# Patient Record
(demographics unavailable — no encounter records)

---

## 2024-12-20 NOTE — ASSESSMENT
[FreeTextEntry1] : 67-year-old man with BPH with LUTS. Not bothersome.w ill observe.   For LEFT renal stone, discussed treatment with ureteroscopy but pt declines for now. Sandra send for RBUS   For gross hematuria, discussed last work up was 3 years ago. Will schedule cystoscopy.

## 2024-12-20 NOTE — HISTORY OF PRESENT ILLNESS
[FreeTextEntry1] : 65 year old man seen 07/05/2022 with complaint possible hematuria/ blood through the urethra. Patient reports he noted some blood on his underwear near his genitalia and suspects it may have been from his penis, he reports he examined himself and did not note any abrasions or lesions. No other episodes. He denies seeing any blood in the urine. He denies any hx or smoking or chemical exposures. Nothing makes the symptoms better, nothing makes sx worse.  It is associated with nothing. No current gross hematuria, no dysuria, no frequency, no urgency, no hesitancy, no straining. No incontinence.  No fevers, no chills, no nausea, no vomiting, no flank pain.   02/13/2023: Patient presents for follow up. He denies any new or acute LUTS. No hematuria. Cysto and CTU were neg 7/2022. UAs all neg for elevated RBCs. 0.96 PSA  02/23/2023: Patient presents for follow up. He is very concerned about his hematuria. Repeat UA showed 5 RBCs. He denies any new LUTS or other acute changes.  03/12/2024: Patient presents for follow up. Has had several episodes where he has seen blood in his underwear that he believes is from his urethra. Patient denies discharge or recent UTI. Endorses variable to weak urinary stream accompanied by some urgency and leakage of urine throughout the day. Nocturia 2-3x. PVR 0.  No dysuria, no frequency, no urgency, no hesitancy, no straining. No incontinence.  No fevers, no chills, no nausea, no vomiting, no flank pain.   10/04/2024: Patient presents for follow up. He reports his son was diagnosed with APKD and he wants to be able to donate his kidney one day should his son need it. He saw nephrologist, eGFR was 60. He hydrated and brought eGFR up to 70. He had RBUS which showed 5 mm LEFT renal stone, no hydronephrosis; prevoid volume 300, PVR on RBUS 156, prostate volume 50 cc. He reports some weak tream, urgency, but not bothersome. PSA 1.37. Random bladder scan 87 mL.  12/20/2024: Patient presents for follow up. He reports he has been having small volume urine leakage overnight, putting tissue or toilet paper. He had severe pain at tip a few days ago. Later noticed blood on TP.

## 2024-12-20 NOTE — PHYSICAL EXAM
[General Appearance - Well Developed] : well developed [General Appearance - Well Nourished] : well nourished [Not Anxious] : not anxious [Normal Appearance] : normal appearance [Well Groomed] : well groomed [General Appearance - In No Acute Distress] : no acute distress [Edema] : no peripheral edema [Exaggerated Use Of Accessory Muscles For Inspiration] : no accessory muscle use [Respiration, Rhythm And Depth] : normal respiratory rhythm and effort [Abdomen Soft] : soft [Abdomen Tenderness] : non-tender [Costovertebral Angle Tenderness] : no ~M costovertebral angle tenderness [Urinary Bladder Findings] : the bladder was normal on palpation [Normal Station and Gait] : the gait and station were normal for the patient's age [] : no rash [No Focal Deficits] : no focal deficits [Oriented To Time, Place, And Person] : oriented to person, place, and time [Affect] : the affect was normal [Mood] : the mood was normal [No Palpable Adenopathy] : no palpable adenopathy

## 2024-12-20 NOTE — PHYSICAL EXAM
[General Appearance - Well Nourished] : well nourished [General Appearance - Well Developed] : well developed [Not Anxious] : not anxious [Normal Appearance] : normal appearance [Well Groomed] : well groomed [General Appearance - In No Acute Distress] : no acute distress [Edema] : no peripheral edema [Exaggerated Use Of Accessory Muscles For Inspiration] : no accessory muscle use [Respiration, Rhythm And Depth] : normal respiratory rhythm and effort [Abdomen Soft] : soft [Abdomen Tenderness] : non-tender [Costovertebral Angle Tenderness] : no ~M costovertebral angle tenderness [Urinary Bladder Findings] : the bladder was normal on palpation [Normal Station and Gait] : the gait and station were normal for the patient's age [] : no rash [No Focal Deficits] : no focal deficits [Oriented To Time, Place, And Person] : oriented to person, place, and time [Affect] : the affect was normal [Mood] : the mood was normal [No Palpable Adenopathy] : no palpable adenopathy

## 2025-03-31 NOTE — PROCEDURE
[Cystoscopy] : cystoscopy [Hematuria - Microscopic] : microscopic hematuria [Patient] : the patient [Consent Obtained] : written consent was obtained prior to the procedure and is detailed in the patient's record [Last Aspirin Dose ____] : Reviewed last aspirin dose: [unfilled] [Allergies Reviewed] : Allergies reviewed [Time Patient Entered Room ___] : patient entered room: [unfilled] [Time Out ___] : time out occurred at [unfilled] as per policy [Procedure Start Time ___] : procedure start time: [unfilled] [Procedure Stop Time ___] : procedure stop time: [unfilled] [Time Patient Exited Room ___] : patient exited room: [unfilled] [Supine] : supine [Betadine] : with betadine [Intraurethral 2% Lidocaine Gel ___ (cc)] : [unfilled] cc of 2% Lidocaine Gel was administered intraurethrally  [Flexible Cystoscope] : A flexible cystoscope was used to visualize the urethra and bladder. [Bilobar Hypertrophy] : had enlargement of the lateral prostatic lobes [Obstructed] : was obstructed [No Bladder Tumor] : No bladder tumor visualized [de-identified] : trabeculation [1] : a grade 1 [] : not  present in the bladder wall [FreeTextEntry3] : jorge alberto [de-identified] : intravesical extension of prostate, but no discrete median lobe

## 2025-03-31 NOTE — HISTORY OF PRESENT ILLNESS
[FreeTextEntry1] : 65 year old man seen 07/05/2022 with complaint possible hematuria/ blood through the urethra. Patient reports he noted some blood on his underwear near his genitalia and suspects it may have been from his penis, he reports he examined himself and did not note any abrasions or lesions. No other episodes. He denies seeing any blood in the urine. He denies any hx or smoking or chemical exposures. Nothing makes the symptoms better, nothing makes sx worse.  It is associated with nothing. No current gross hematuria, no dysuria, no frequency, no urgency, no hesitancy, no straining. No incontinence.  No fevers, no chills, no nausea, no vomiting, no flank pain.   02/13/2023: Patient presents for follow up. He denies any new or acute LUTS. No hematuria. Cysto and CTU were neg 7/2022. UAs all neg for elevated RBCs. 0.96 PSA  02/23/2023: Patient presents for follow up. He is very concerned about his hematuria. Repeat UA showed 5 RBCs. He denies any new LUTS or other acute changes.  03/12/2024: Patient presents for follow up. Has had several episodes where he has seen blood in his underwear that he believes is from his urethra. Patient denies discharge or recent UTI. Endorses variable to weak urinary stream accompanied by some urgency and leakage of urine throughout the day. Nocturia 2-3x. PVR 0.  No dysuria, no frequency, no urgency, no hesitancy, no straining. No incontinence.  No fevers, no chills, no nausea, no vomiting, no flank pain.   10/04/2024: Patient presents for follow up. He reports his son was diagnosed with APKD and he wants to be able to donate his kidney one day should his son need it. He saw nephrologist, eGFR was 60. He hydrated and brought eGFR up to 70. He had RBUS which showed 5 mm LEFT renal stone, no hydronephrosis; prevoid volume 300, PVR on RBUS 156, prostate volume 50 cc. He reports some weak tream, urgency, but not bothersome. PSA 1.37. Random bladder scan 87 mL.  12/20/2024: Patient presents for follow up. He reports he has been having small volume urine leakage overnight, putting tissue or toilet paper. He had severe pain at tip a few days ago. Later noticed blood on TP.   03/31/2025: Patient presents for follow up. He reports he had small "flecks of blood"" in urine while on trip to [facility] He saw local urologist who did JD and reported nodule on prostate. He is here to discuss further. PSA 2.0

## 2025-03-31 NOTE — ASSESSMENT
[FreeTextEntry1] : 69 yo M with abnormal JD of prostate. PSA wnl. WIll send for MRI of prostate for fusion biopsy planning.

## 2025-03-31 NOTE — DISCUSSION/SUMMARY
[FreeTextEntry1] : 67 yo M with microscopic hematuria. Neg work up 2022, now s/p neg cysto and RBUS.  RTO in 6 moths for sx check and Urine studies. He is interested in PUL He does not want to be scheduled now, but will call when he is ready.

## 2025-03-31 NOTE — PHYSICAL EXAM
[Normal Appearance] : normal appearance [Well Groomed] : well groomed [General Appearance - In No Acute Distress] : no acute distress [Edema] : no peripheral edema [Respiration, Rhythm And Depth] : normal respiratory rhythm and effort [Exaggerated Use Of Accessory Muscles For Inspiration] : no accessory muscle use [Abdomen Soft] : soft [Abdomen Tenderness] : non-tender [Costovertebral Angle Tenderness] : no ~M costovertebral angle tenderness [Urinary Bladder Findings] : the bladder was normal on palpation [Normal Station and Gait] : the gait and station were normal for the patient's age [] : no rash [No Focal Deficits] : no focal deficits [Oriented To Time, Place, And Person] : oriented to person, place, and time [Affect] : the affect was normal [Mood] : the mood was normal [No Palpable Adenopathy] : no palpable adenopathy [de-identified] : indurated RIGHT hemiprostate from base to apex

## 2025-07-30 NOTE — PHYSICAL EXAM
[Alert] : alert [Normal Voice/Communication] : normal voice/communication [Healthy Appearing] : healthy appearing [No Acute Distress] : no acute distress [Sclera] : the sclera and conjunctiva were normal [Hearing Threshold Finger Rub Not [place]] : hearing was normal [Normal Lips/Gums] : the lips and gums were normal [Oropharynx] : the oropharynx was normal [Normal Appearance] : the appearance of the neck was normal [No Neck Mass] : no neck mass was observed [No Respiratory Distress] : no respiratory distress [No Acc Muscle Use] : no accessory muscle use [Respiration, Rhythm And Depth] : normal respiratory rhythm and effort [Auscultation Breath Sounds / Voice Sounds] : lungs were clear to auscultation bilaterally [Heart Rate And Rhythm] : heart rate was normal and rhythm regular [Normal S1, S2] : normal S1 and S2 [Murmurs] : no murmurs [Bowel Sounds] : normal bowel sounds [Abdomen Tenderness] : non-tender [No Masses] : no abdominal mass palpated [Abdomen Soft] : soft [] : no hepatosplenomegaly [Oriented To Time, Place, And Person] : oriented to person, place, and time

## 2025-07-30 NOTE — HISTORY OF PRESENT ILLNESS
[FreeTextEntry1] : Mr. LISA RAMIREZ is a 68 year old male with history of GERD symptoms.  Patient recently underwent prostatectomy for prostate cancer.  Patient had last colonoscopy in 2022 in which a diminutive polyp was removed.  Patient has noted increasing heartburn symptoms and is concerned about etiology.  There is no dysphagia or weight loss.  No other changes in medical history.

## 2025-07-30 NOTE — ASSESSMENT
[FreeTextEntry1] : 67 yo male with history of GERD symptoms despite PPIs.  Patient to continue lifestyle modifications along with pantoprazole.  Arrange for upper endoscopy for evaluation. Risks, benefits, and alternatives to procedure explained to patient.  Patient is willing to proceed.